# Patient Record
Sex: FEMALE | Race: WHITE | Employment: FULL TIME
[De-identification: names, ages, dates, MRNs, and addresses within clinical notes are randomized per-mention and may not be internally consistent; named-entity substitution may affect disease eponyms.]

---

## 2017-07-22 ENCOUNTER — HEALTH MAINTENANCE LETTER (OUTPATIENT)
Age: 37
End: 2017-07-22

## 2017-10-21 ENCOUNTER — HOSPITAL ENCOUNTER (EMERGENCY)
Facility: CLINIC | Age: 37
Discharge: HOME | End: 2017-10-21
Attending: EMERGENCY MEDICINE | Admitting: EMERGENCY MEDICINE
Payer: COMMERCIAL

## 2017-10-21 ENCOUNTER — APPOINTMENT (OUTPATIENT)
Dept: CT IMAGING | Facility: CLINIC | Age: 37
End: 2017-10-21
Attending: EMERGENCY MEDICINE
Payer: COMMERCIAL

## 2017-10-21 ENCOUNTER — APPOINTMENT (OUTPATIENT)
Dept: ULTRASOUND IMAGING | Facility: CLINIC | Age: 37
End: 2017-10-21
Attending: EMERGENCY MEDICINE
Payer: COMMERCIAL

## 2017-10-21 VITALS
HEART RATE: 73 BPM | RESPIRATION RATE: 16 BRPM | OXYGEN SATURATION: 100 % | HEIGHT: 69 IN | SYSTOLIC BLOOD PRESSURE: 137 MMHG | WEIGHT: 240 LBS | TEMPERATURE: 97 F | DIASTOLIC BLOOD PRESSURE: 86 MMHG | BODY MASS INDEX: 35.55 KG/M2

## 2017-10-21 DIAGNOSIS — I82.811 ACUTE SUPERFICIAL VENOUS THROMBOSIS OF LOWER EXTREMITY, RIGHT: ICD-10-CM

## 2017-10-21 LAB
ALBUMIN SERPL-MCNC: 3.3 G/DL (ref 3.4–5)
ALP SERPL-CCNC: 58 U/L (ref 40–150)
ALT SERPL W P-5'-P-CCNC: 22 U/L (ref 0–50)
ANION GAP SERPL CALCULATED.3IONS-SCNC: 4 MMOL/L (ref 3–14)
AST SERPL W P-5'-P-CCNC: 15 U/L (ref 0–45)
BASOPHILS # BLD AUTO: 0.1 10E9/L (ref 0–0.2)
BASOPHILS NFR BLD AUTO: 1 %
BILIRUB SERPL-MCNC: 0.7 MG/DL (ref 0.2–1.3)
BUN SERPL-MCNC: 14 MG/DL (ref 7–30)
CALCIUM SERPL-MCNC: 7.9 MG/DL (ref 8.5–10.1)
CHLORIDE SERPL-SCNC: 107 MMOL/L (ref 94–109)
CO2 SERPL-SCNC: 26 MMOL/L (ref 20–32)
CREAT SERPL-MCNC: 0.7 MG/DL (ref 0.52–1.04)
DIFFERENTIAL METHOD BLD: ABNORMAL
EOSINOPHIL # BLD AUTO: 0.1 10E9/L (ref 0–0.7)
EOSINOPHIL NFR BLD AUTO: 1.2 %
ERYTHROCYTE [DISTWIDTH] IN BLOOD BY AUTOMATED COUNT: 16.6 % (ref 10–15)
GFR SERPL CREATININE-BSD FRML MDRD: >90 ML/MIN/1.7M2
GLUCOSE SERPL-MCNC: 94 MG/DL (ref 70–99)
HCT VFR BLD AUTO: 35.4 % (ref 35–47)
HGB BLD-MCNC: 10.7 G/DL (ref 11.7–15.7)
IMM GRANULOCYTES # BLD: 0 10E9/L (ref 0–0.4)
IMM GRANULOCYTES NFR BLD: 0.2 %
LYMPHOCYTES # BLD AUTO: 1.6 10E9/L (ref 0.8–5.3)
LYMPHOCYTES NFR BLD AUTO: 30.7 %
MCH RBC QN AUTO: 23.4 PG (ref 26.5–33)
MCHC RBC AUTO-ENTMCNC: 30.2 G/DL (ref 31.5–36.5)
MCV RBC AUTO: 78 FL (ref 78–100)
MONOCYTES # BLD AUTO: 0.4 10E9/L (ref 0–1.3)
MONOCYTES NFR BLD AUTO: 8.2 %
NEUTROPHILS # BLD AUTO: 3 10E9/L (ref 1.6–8.3)
NEUTROPHILS NFR BLD AUTO: 58.7 %
NRBC # BLD AUTO: 0 10*3/UL
NRBC BLD AUTO-RTO: 0 /100
PLATELET # BLD AUTO: 276 10E9/L (ref 150–450)
POTASSIUM SERPL-SCNC: 4.5 MMOL/L (ref 3.4–5.3)
PROT SERPL-MCNC: 7 G/DL (ref 6.8–8.8)
RBC # BLD AUTO: 4.57 10E12/L (ref 3.8–5.2)
SODIUM SERPL-SCNC: 137 MMOL/L (ref 133–144)
TROPONIN I SERPL-MCNC: <0.015 UG/L (ref 0–0.04)
WBC # BLD AUTO: 5.1 10E9/L (ref 4–11)

## 2017-10-21 PROCEDURE — 25000128 H RX IP 250 OP 636: Performed by: EMERGENCY MEDICINE

## 2017-10-21 PROCEDURE — 85025 COMPLETE CBC W/AUTO DIFF WBC: CPT | Performed by: EMERGENCY MEDICINE

## 2017-10-21 PROCEDURE — 84484 ASSAY OF TROPONIN QUANT: CPT | Performed by: EMERGENCY MEDICINE

## 2017-10-21 PROCEDURE — 93971 EXTREMITY STUDY: CPT | Mod: RT

## 2017-10-21 PROCEDURE — 80053 COMPREHEN METABOLIC PANEL: CPT | Performed by: EMERGENCY MEDICINE

## 2017-10-21 PROCEDURE — 71260 CT THORAX DX C+: CPT

## 2017-10-21 PROCEDURE — 99285 EMERGENCY DEPT VISIT HI MDM: CPT | Mod: 25

## 2017-10-21 PROCEDURE — 96360 HYDRATION IV INFUSION INIT: CPT | Mod: 59

## 2017-10-21 RX ORDER — IOPAMIDOL 755 MG/ML
500 INJECTION, SOLUTION INTRAVASCULAR ONCE
Status: COMPLETED | OUTPATIENT
Start: 2017-10-21 | End: 2017-10-21

## 2017-10-21 RX ADMIN — SODIUM CHLORIDE 100 ML: 9 INJECTION, SOLUTION INTRAVENOUS at 10:13

## 2017-10-21 RX ADMIN — IOPAMIDOL 85 ML: 755 INJECTION, SOLUTION INTRAVENOUS at 10:12

## 2017-10-21 RX ADMIN — SODIUM CHLORIDE 500 ML: 9 INJECTION, SOLUTION INTRAVENOUS at 09:23

## 2017-10-21 ASSESSMENT — ENCOUNTER SYMPTOMS
JOINT SWELLING: 0
CHEST TIGHTNESS: 1
COUGH: 0
FEVER: 0
TROUBLE SWALLOWING: 0
BACK PAIN: 0
CHILLS: 0
RHINORRHEA: 0
DIAPHORESIS: 0
NECK PAIN: 0
SHORTNESS OF BREATH: 1
MYALGIAS: 1
SORE THROAT: 0
NUMBNESS: 0
COLOR CHANGE: 0
WEAKNESS: 0

## 2017-10-21 NOTE — ED AVS SNAPSHOT
[facility] Emergency Department    [address]    [facility] [address]    Phone:  [phone]    Fax:  [phone]                                       Moris Rhoades   MRN: [id number]    Department:  [facility] Emergency Department   Date of Visit:  10/21/2017           After Visit Summary Signature Page     I have received my discharge instructions, and my questions have been answered. I have discussed any challenges I see with this plan with the nurse or doctor.    ..........................................................................................................................................  Patient/Patient Representative Signature      ..........................................................................................................................................  Patient Representative Print Name and Relationship to Patient    ..................................................               ................................................  Date                                            Time    ..........................................................................................................................................  Reviewed by Signature/Title    ...................................................              ..............................................  Date                                                            Time

## 2017-10-21 NOTE — ED NOTES
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 Pt first noted pain on Tuesday seen in [facility] Thurs dx with superficial blood clot at that time, No lab tests done and pt is not on blood thinners. Pain has increased pt notes it has been more difficult to catch her breath. VSS, A/ox 4, ABC's intact. Pain worse with ambulation.

## 2017-10-21 NOTE — ED AVS SNAPSHOT
[facility] Emergency Department    [address] [place] [address]    Phone:  [phone]    Fax:  [phone]                                       Moris Rhoades   MRN: [id number]    Department:  [facility] Emergency Department   Date of Visit:  10/21/2017           Patient Information     Date Of Birth          1980        Your diagnoses for this visit were:     Acute superficial venous thrombosis of lower extremity, right        You were seen by Ro Weiss MD.      Follow-up Information     Follow up with Chantel Chamberlain MD In 3 days.    Contact information:    [facility]  [address] [place] [address]  [phone]          Discharge Instructions       Please follow up closely with your regular physician within the next 3 days. Please return to the ED if your symptoms worsen or if you develop new or concerning symptoms.       Superficial Thrombophlebitis   The superficial veins are the veins near the surface of the skin. Superficial thrombophlebitis is a problem that occurs when one or more of these veins become inflamed (red, irritated, and swollen). This is most often because of a blood clot.  Causes  The problem may occur after injury to a vein. It may also occur after having an intravenous (IV) line placed. Other factors that can make the problem more likely include:    Varicose veins    Venous insufficiency    Bleeding disorders    Prolonged periods of rest and not moving around    IV drug abuse  Symptoms  Symptoms may appear in the affected area. They can include:    Pain    Tenderness    Redness    Warmth    Swelling    Hardening of the vein  In most cases, superficial thrombophlebitis resolves on its own with no problems. Treatment is focused on relieving symptoms.  Sometimes, there is a risk that the deep veins in the body may also be involved. This can lead to more serious problems. In such cases, further testing and  treatments may be needed. Your healthcare provider can tell you more about this.  Home Care  To help relieve pain and swelling, you may be told to:    Apply heat or cold to the affected area. Do this for up to 10 minutes as often as directed.    Heat: Use a warm compress, such as a heating pad.    Cold: Use a cold compress, such as a cold pack or bag of ice wrapped in a thin towel.    Take nonsteroidal anti-inflammatory drugs (NSAIDS), such as ibuprofen. In some cases, other pain medicines may be prescribed.    Keep the affected limb (arm or leg) raised above heart level as directed.    Wear elastic compression stockings or bandages as directed.    Avoid prolonged sitting or standing. Get up and walk often.  To help treat a blood clot, a blood thinner (anticoagulant) may be prescribed. If this is needed, be sure to take the medicine exactly as directed.  Follow-up care  Follow up with your healthcare provider as advised. If imaging tests are done, they will be reviewed by a doctor. You ll be told the results and any new findings that may affect your treatment.  When to seek medical advice  Call your healthcare provider right away if any of these occur:    Fever of 100.4 F (38 C) or higher, or as directed by your provider    Increasing pain, swelling, or tenderness in the affected area    Spreading warmth or redness in the affected area  Call 911  Call 911 right away if any of these occur:    Trouble breathing    Chest pain or discomfort that worsens with deep breathing or coughing    Coughing (may cough up blood)    Fast or irregular heartbeat    Sweating    Anxiety    Lightheadedness, dizziness, or fainting    Extreme confusion    Extreme drowsiness or trouble waking up    New pain in the chest, arm, shoulder, neck, or upper back  Date Last Reviewed: 9/21/2015 2000-2017 [employer]. [address] [place] [address], [place] [address]. All rights reserved. This information is not intended as a substitute for  professional medical care. Always follow your healthcare professional's instructions.          24 Hour Appointment Hotline       To make an appointment at any [facility], call [phone] ([phone]). If you don't have a family doctor or clinic, we will help you find one. [place] clinics are conveniently located to serve the needs of you and your family.             Review of your medicines      START taking        Dose / Directions Last dose taken    enoxaparin 150 MG/ML injection   Commonly known as:  LOVENOX   Dose:  1 mg/kg   Quantity:  9.8 mL        Inject 0.7 mLs (105 mg) Subcutaneous every 12 hours for 7 days   Refills:  0          Our records show that you are taking the medicines listed below. If these are incorrect, please call your family doctor or clinic.        Dose / Directions Last dose taken    NO ACTIVE MEDICATIONS        Refills:  0        traMADol 50 MG tablet   Commonly known as:  ULTRAM   Dose:  50 mg   Quantity:  15 tablet        Take 1 tablet (50 mg) by mouth every 6 hours as needed for pain   Refills:  0        TYLENOL PO        Refills:  0                Prescriptions were sent or printed at these locations (1 Prescription)                   Other Prescriptions                Printed at Department/Unit printer (1 of 1)         enoxaparin (LOVENOX) 150 MG/ML injection                Procedures and tests performed during your visit     CBC + differential    Chest CT, IV contrast only - PE protocol    Comprehensive metabolic panel    EKG 12 lead    Troponin I    US Lower Extremity Venous Duplex Right      Orders Needing Specimen Collection     None      Pending Results     No orders found from 10/19/2017 to 10/22/2017.            Pending Culture Results     No orders found from 10/19/2017 to 10/22/2017.            Pending Results Instructions     If you had any lab results that were not finalized at the time of your Discharge, you can call the ED Lab Result RN at [phone]. You  will be contacted by this team for any positive Lab results or changes in treatment. The nurses are available 7 days a week from 10A to 6:30P.  You can leave a message 24 hours per day and they will return your call.        Test Results From Your Hospital Stay        10/21/2017  9:27 AM      Component Results     Component Value Ref Range & Units Status    WBC 5.1 4.0 - 11.0 10e9/L Final    RBC Count 4.57 3.8 - 5.2 10e12/L Final    Hemoglobin 10.7 (L) 11.7 - 15.7 g/dL Final    Hematocrit 35.4 35.0 - 47.0 % Final    MCV 78 78 - 100 fl Final    MCH 23.4 (L) 26.5 - 33.0 pg Final    MCHC 30.2 (L) 31.5 - 36.5 g/dL Final    RDW 16.6 (H) 10.0 - 15.0 % Final    Platelet Count 276 150 - 450 10e9/L Final    Diff Method Automated Method  Final    % Neutrophils 58.7 % Final    % Lymphocytes 30.7 % Final    % Monocytes 8.2 % Final    % Eosinophils 1.2 % Final    % Basophils 1.0 % Final    % Immature Granulocytes 0.2 % Final    Nucleated RBCs 0 0 /100 Final    Absolute Neutrophil 3.0 1.6 - 8.3 10e9/L Final    Absolute Lymphocytes 1.6 0.8 - 5.3 10e9/L Final    Absolute Monocytes 0.4 0.0 - 1.3 10e9/L Final    Absolute Eosinophils 0.1 0.0 - 0.7 10e9/L Final    Absolute Basophils 0.1 0.0 - 0.2 10e9/L Final    Abs Immature Granulocytes 0.0 0 - 0.4 10e9/L Final    Absolute Nucleated RBC 0.0  Final         10/21/2017  9:46 AM      Component Results     Component Value Ref Range & Units Status    Troponin I ES <0.015 0.000 - 0.045 ug/L Final    The 99th percentile for upper reference range is 0.045 ug/L.  Troponin values   in the range of 0.045 - 0.120 ug/L may be associated with risks of adverse   clinical events.           10/21/2017 10:31 AM      Narrative       RIGHT LEG VENOUS ULTRASOUND 10/21/2017 9:54 AM    HISTORY: pain, Hx of factor 5 leiden, recent diagnosis of superficial  clot     TECHNIQUE; Venous Doppler ultrasound with color flow and spectral  Doppler with waveform analysis performed. Compression and  augmentation  performed.    COMPARISON no prior available ultrasound of the right leg.     FINDINGS: There is no evidence for deep vein thrombus in the right  common femoral, superficial femoral, popliteal, or visualized portions  of posterior tibial veins. Exam is positive for superficial thrombus  in the lesser saphenous vein at the level of the knee through the mid  calf.        Impression     IMPRESSION:   1. Positive for lesser saphenous vein thrombus at the level of the  knee. No evidence for deep vein thrombus right leg.    DUANE FLOOD MD         10/21/2017 10:35 AM      Narrative     CT CHEST PULMONARY EMBOLISM WITH OOIFVXNX14/21/2017 10:27 AM    HISTORY: Shortness of breath, Hx of factor 5 leiden    TECHNIQUE: Axial images from thoracic inlet to diaphragm. 85mL  Isovue-370 IV contrast.  Radiation dose for this scan was reduced  using automated exposure control, adjustment of the mA and/or kV  according to patient size, or iterative reconstruction technique.    COMPARISON: 8/1/2007 CT chest    FINDINGS:   CHEST: No evidence for acute pulmonary embolus. No aortic dissection,  mediastinal or hilar adenopathy. Postop changes of gastric bypass. The  lungs are clear. No aggressive bone lesions.        Impression     IMPRESSION:   1. No acute pulmonary embolus, pleural effusion or acute pulmonary  abnormality.    DUANE FLOOD MD         10/21/2017  9:46 AM      Component Results     Component Value Ref Range & Units Status    Sodium 137 133 - 144 mmol/L Final    Potassium 4.5 3.4 - 5.3 mmol/L Final    Chloride 107 94 - 109 mmol/L Final    Carbon Dioxide 26 20 - 32 mmol/L Final    Anion Gap 4 3 - 14 mmol/L Final    Glucose 94 70 - 99 mg/dL Final    Urea Nitrogen 14 7 - 30 mg/dL Final    Creatinine 0.70 0.52 - 1.04 mg/dL Final    GFR Estimate >90 >60 mL/min/1.7m2 Final    Non  GFR Calc    GFR Estimate If Black >90 >60 mL/min/1.7m2 Final    African American GFR Calc    Calcium 7.9 (L) 8.5 -  10.1 mg/dL Final    Bilirubin Total 0.7 0.2 - 1.3 mg/dL Final    Albumin 3.3 (L) 3.4 - 5.0 g/dL Final    Protein Total 7.0 6.8 - 8.8 g/dL Final    Alkaline Phosphatase 58 40 - 150 U/L Final    ALT 22 0 - 50 U/L Final    AST 15 0 - 45 U/L Final                Clinical Quality Measure: Blood Pressure Screening     Your blood pressure was checked while you were in the emergency department today. The last reading we obtained was  BP: 113/72 . Please read the guidelines below about what these numbers mean and what you should do about them.  If your systolic blood pressure (the top number) is less than 120 and your diastolic blood pressure (the bottom number) is less than 80, then your blood pressure is normal. There is nothing more that you need to do about it.  If your systolic blood pressure (the top number) is 120-139 or your diastolic blood pressure (the bottom number) is 80-89, your blood pressure may be higher than it should be. You should have your blood pressure rechecked within a year by a primary care provider.  If your systolic blood pressure (the top number) is 140 or greater or your diastolic blood pressure (the bottom number) is 90 or greater, you may have high blood pressure. High blood pressure is treatable, but if left untreated over time it can put you at risk for heart attack, stroke, or kidney failure. You should have your blood pressure rechecked by a primary care provider within the next 4 weeks.  If your provider in the emergency department today gave you specific instructions to follow-up with your doctor or provider even sooner than that, you should follow that instruction and not wait for up to 4 weeks for your follow-up visit.        Thank you for choosing [facility]       Thank you for choosing [facility] for your care. Our goal is always to provide you with excellent care. Hearing back from our patients is one way we can continue to improve our services. Please take a few minutes to complete the  written survey that you may receive in the mail after you visit with us. Thank you!        [employer] Information     [facility] gives you secure access to your electronic health record. If you see a primary care provider, you can also send messages to your care team and make appointments. If you have questions, please call your primary care clinic.  If you do not have a primary care provider, please call [phone] and they will assist you.        Care EveryWhere ID     This is your Care EveryWhere ID. This could be used by other organizations to access your [facility] medical records  [id number]        Equal Access to Services     [facility] : Tanner Nieto, wang salazar, panfilo matt, zeeshan varner . So [facility] [phone].    ATENCIÓN: Si habla español, tiene a barlow disposición servicios gratuitos de asistencia lingüística. Ammon al [phone].    We comply with applicable federal civil rights laws and [place] laws. We do not discriminate on the basis of race, color, national origin, age, disability, sex, sexual orientation, or gender identity.            After Visit Summary       This is your record. Keep this with you and show to your community pharmacist(s) and doctor(s) at your next visit.

## 2017-10-21 NOTE — ED NOTES
Patient has returned from radiology. No change in pain or shortness of breath.  Patient is watching TV, Denies needs.

## 2017-10-21 NOTE — ED PROVIDER NOTES
History     Chief Complaint:  Leg pain    HPI   Moris Rhoades is a 37 year old female with a history of DVT and factor 5 Leiden who presents to the ED for evaluation of leg pain. The patient reports that she started experiencing pain in her right inner calf 4 days ago. 2 days ago, she was seen in urgent care where they did an ultrasound. This revealed a superficial thrombus and she was told to use heat and try to stay off of the leg as much as possible. Today, the pain has gotten worse and has traveled up to the medial aspect of the knee and the popliteal region. She also notes having chest discomfort today and is feeling short of breath. She denies any redness or swelling of the leg but notes she wore compression stockings on and off yesterday. She notes that she has been sitting more but denies any recent long travel. She denies any rash, fever, cough, recent cold, numbness, weakness, neck pain, back pain, or any other symptoms. She does not have a hematologist but notes seeing Dr. Odonnell at a thrombosis clinic about 1 1/2 years ago.       CARDIAC RISK FACTORS:  Sex:    Female  Tobacco:   No  Hypertension:   No  Hyperlipidemia:  No  Diabetes:   No  Family History:  No    PE/DVT RISK FACTORS:  Sex:    Female  Hormones:   Yes  Tobacco:   No  Cancer:   No  Travel:   No  Surgery:   No  Other immobilization: No  Personal history:  Yes  Family history:  No    Allergies:  Nsaids  Sulfa drugs (rash)     Medications:    Mirena  Tylenol  Ultram     Past Medical History:    Abdominal pain  Acute venous embolism and thrombosis of deep vessels of proximal lower extremity  Adjustment reaction  Benign neoplasm of ovary  Eating disorder  Embolism and thrombosis  Excessive or frequent menstruation  Factor 5 Leiden mutation, heterozygous  Headache  Irregular menstrual cycle  Joint pain, left leg  Obesity  Pneumonia, organism    Past Surgical History:    Repair anterior tibial tubercle, left  Bariatric surgery, gastric  "bypass  Endometrial biopsy without cervical dilation  Left knee arthroscopy with plica resection  Knee scope, med/lat menisectomy, left   Removal of left ovary due to large cyst, torison    Family History:    Diabetes  Allergies  Hypertension  Asthma    Social History:  Smoking status: Never  Alcohol use: Yes  Marital Status: Single      Review of Systems   Constitutional: Negative for chills, diaphoresis and fever.   HENT: Negative for congestion, rhinorrhea, sore throat and trouble swallowing.    Respiratory: Positive for chest tightness (pressure/discomfort) and shortness of breath. Negative for cough.    Cardiovascular: Negative for chest pain and leg swelling.   Musculoskeletal: Positive for myalgias (right leg). Negative for back pain, gait problem, joint swelling and neck pain.   Skin: Negative for color change and rash.   Neurological: Negative for weakness and numbness.   All other systems reviewed and are negative.    Physical Exam   Patient Vitals for the past 24 hrs:   BP Temp Temp src Pulse Resp SpO2 Height Weight   10/21/17 1000 113/72 - - 53 16 100 % - -   10/21/17 0850 - 97  F (36.1  C) Temporal 82 18 100 % - -   10/21/17 0833 124/81 97  F (36.1  C) Temporal 71 16 97 % 1.753 m (5' 9\") 108.9 kg (240 lb)     Physical Exam  Constitutional: The patient is oriented to person, place, and time. Alert and cooperative.  HENT:   Right Ear: External ear normal.   Left Ear: External ear normal.   Nose: Nose normal.   Mouth/Throat: Uvula is midline, oropharynx is clear and moist and mucous membranes are normal. No posterior oropharyngeal edema or erythema.   Eyes: Conjunctivae, EOM and lids are normal. Pupils are equal, round, and reactive to light.   Neck: Trachea normal. Normal range of motion. Neck supple.   Cardiovascular: Normal rate, regular rhythm, normal heart sounds, and intact distal pulses.    Pulmonary/Chest: Effort normal and breath sounds equal bilaterally. No crackles or wheezing.   Abdominal: " Soft. No tenderness. No rebound and no guarding.   Musculoskeletal: Normal range of motion.  No extremity erythema or edema. Tenderness to palpation over the R calf region.  Neurological: Alert and Oriented. Strength 5/5 in upper and lower extremities bilaterally. Sensation intact to light touch throughout.  Skin: Skin is dry. No rash noted.          Emergency Department Course   ECG (09:01:35):  Rate 54 bpm. SD interval 156. QRS duration 84. QT/QTc 416/394. P-R-T axes 49 2 28. Sinus bradycardia. Otherwise normal ECG. Interpreted at 0904 by Ro Weiss MD.    Imaging:  Radiographic findings were communicated with the patient who voiced understanding of the findings.    CT-scan Chest w/ IV contrast only - PE protocol:  1. No acute pulmonary embolus, pleural effusion or acute pulmonary  abnormality.  Result per radiology.     US Lower Extremity Venous Duplex Right:  IMPRESSION:   1. Positive for lesser saphenous vein thrombus at the level of the  knee. No evidence for deep vein thrombus right leg.  Results per radiology.     Laboratory:  CMP: Calcium 7.9 (L), Albumin 3.3 (L) o/w WNL (Creatinine 0.70)  CBC: HGB 10.7 (L) o/w WNL (WBC 5.1, )   (0915) Troponin: <0.015    Interventions:  0923:  mL IV Bolus  1013:  mL IV Bolus    Emergency Department Course:  Past medical records, nursing notes, and vitals reviewed.  0849: I performed an exam of the patient and obtained history, as documented above. GCS 15.    The patient was sent for a CT and US while in the emergency department, findings above.    IV inserted and blood drawn.    1137: I discussed the case with Dr. Simons with oncology regarding the patient.    1156: I rechecked the patient. Findings and plan explained to the Patient. Patient discharged home with instructions regarding supportive care, medications, and reasons to return. The importance of close follow-up was reviewed.     Impression & Plan      Medical Decision Making:  Moris ALFARO  Jf is a 37 year old female with a history of DVT and factor 5 Leiden, not on anticoagulation, who presents to the ED for evaluation of right lower extremity pain. Upon presentation in the ED, the patient is non-toxic appearing. Vitals are within normal limits and stable. On exam, the patient is well appearing. The patient is alert, oriented, and neuro exam is non-focal. Cardiopulmonary exam is unremarkable. The abdomen is soft and non-tender throughout. On exam of the right lower extremity, I do not appreciate any obvious deformity. There is no significant edema or erythema. She does endorse tenderness to palpation in the calf region. She is neurovascularly intact. The rest of the exam is as mentioned above. Ultrasound imaging of the right lower extremity was obtained, demonstrating no evidence of DVT, but does demonstrate a lesser saphenous vein thrombus at the level of the knee. This is consistent with a superficial thrombus. Given that the patient does endorse chest pain and shortness of breath, a CT PE study was obtained and demonstrates no evidence for PE. There is no other acute pulmonary process noted. Labs were obtained and are as mentioned above. An EKG was obtained and demonstrates sinus rhythm. There are no concerning acute ischemic changes. These results were discussed with the patient who states understanding. Overall, given this patient s history and presentation, I do feel that the patient s symptoms are likely secondary to a superficial vein thrombosis. The patient notes that she has been trying conservative management at home without improvement. Overall, given that the patient does have a history of DVT and factor 5 Leiden, I did discuss the patient with the on call hematologist. He did recommend initiation of Lovenox at 1 mg per kg BID for one week. He then recommended that the patient follow up closely with her primary care provider. I discussed this with the patient and he notes  understanding and agreement with this plan. The patient notes that she has been on lovenox in the past and she feels very comfortable taking it. Overall, given that she is well appearing and with a relatively unremarkable work up, I do feel that the patient can be discharged to home. The patient was instructed to follow up closely with their PCP. The patient states understanding and agreement with the plan. Return instructions were given. The patient was stable/improved at time of discharge.     Diagnosis:    ICD-10-CM   1. Acute superficial venous thrombosis of lower extremity, right I82.811       Disposition:  discharged to home    Discharge Medications:  New Prescriptions    ENOXAPARIN (LOVENOX) 150 MG/ML INJECTION    Inject 0.7 mLs (105 mg) Subcutaneous every 12 hours for 7 days         Macie Cano  10/21/2017   [facility] EMERGENCY DEPARTMENT  IMacie, am serving as a scribe at 8:49 AM on 10/21/2017 to document services personally performed by Ro Weiss MD based on my observations and the provider's statements to me.        Ro Weiss MD  10/21/17 1919

## 2017-10-21 NOTE — DISCHARGE INSTRUCTIONS
Please follow up closely with your regular physician within the next 3 days. Please return to the ED if your symptoms worsen or if you develop new or concerning symptoms.       Superficial Thrombophlebitis   The superficial veins are the veins near the surface of the skin. Superficial thrombophlebitis is a problem that occurs when one or more of these veins become inflamed (red, irritated, and swollen). This is most often because of a blood clot.  Causes  The problem may occur after injury to a vein. It may also occur after having an intravenous (IV) line placed. Other factors that can make the problem more likely include:    Varicose veins    Venous insufficiency    Bleeding disorders    Prolonged periods of rest and not moving around    IV drug abuse  Symptoms  Symptoms may appear in the affected area. They can include:    Pain    Tenderness    Redness    Warmth    Swelling    Hardening of the vein  In most cases, superficial thrombophlebitis resolves on its own with no problems. Treatment is focused on relieving symptoms.  Sometimes, there is a risk that the deep veins in the body may also be involved. This can lead to more serious problems. In such cases, further testing and treatments may be needed. Your healthcare provider can tell you more about this.  Home Care  To help relieve pain and swelling, you may be told to:    Apply heat or cold to the affected area. Do this for up to 10 minutes as often as directed.    Heat: Use a warm compress, such as a heating pad.    Cold: Use a cold compress, such as a cold pack or bag of ice wrapped in a thin towel.    Take nonsteroidal anti-inflammatory drugs (NSAIDS), such as ibuprofen. In some cases, other pain medicines may be prescribed.    Keep the affected limb (arm or leg) raised above heart level as directed.    Wear elastic compression stockings or bandages as directed.    Avoid prolonged sitting or standing. Get up and walk often.  To help treat a blood clot, a  blood thinner (anticoagulant) may be prescribed. If this is needed, be sure to take the medicine exactly as directed.  Follow-up care  Follow up with your healthcare provider as advised. If imaging tests are done, they will be reviewed by a doctor. You ll be told the results and any new findings that may affect your treatment.  When to seek medical advice  Call your healthcare provider right away if any of these occur:    Fever of 100.4 F (38 C) or higher, or as directed by your provider    Increasing pain, swelling, or tenderness in the affected area    Spreading warmth or redness in the affected area  Call 911  Call 911 right away if any of these occur:    Trouble breathing    Chest pain or discomfort that worsens with deep breathing or coughing    Coughing (may cough up blood)    Fast or irregular heartbeat    Sweating    Anxiety    Lightheadedness, dizziness, or fainting    Extreme confusion    Extreme drowsiness or trouble waking up    New pain in the chest, arm, shoulder, neck, or upper back  Date Last Reviewed: 9/21/2015 2000-2017 [employer]. [address] [place] [address]. All rights reserved. This information is not intended as a substitute for professional medical care. Always follow your healthcare professional's instructions.

## 2017-10-23 LAB — INTERPRETATION ECG - MUSE: NORMAL

## 2018-09-05 ENCOUNTER — APPOINTMENT (OUTPATIENT)
Dept: CT IMAGING | Facility: CLINIC | Age: 38
End: 2018-09-05
Attending: EMERGENCY MEDICINE
Payer: COMMERCIAL

## 2018-09-05 ENCOUNTER — HOSPITAL ENCOUNTER (EMERGENCY)
Facility: CLINIC | Age: 38
Discharge: HOME | End: 2018-09-05
Attending: EMERGENCY MEDICINE | Admitting: EMERGENCY MEDICINE
Payer: COMMERCIAL

## 2018-09-05 VITALS
TEMPERATURE: 97.8 F | OXYGEN SATURATION: 99 % | RESPIRATION RATE: 16 BRPM | DIASTOLIC BLOOD PRESSURE: 70 MMHG | SYSTOLIC BLOOD PRESSURE: 113 MMHG

## 2018-09-05 DIAGNOSIS — R10.13 ABDOMINAL PAIN, EPIGASTRIC: ICD-10-CM

## 2018-09-05 LAB
ALBUMIN SERPL-MCNC: 3 G/DL (ref 3.4–5)
ALBUMIN UR-MCNC: NEGATIVE MG/DL
ALP SERPL-CCNC: 53 U/L (ref 40–150)
ALT SERPL W P-5'-P-CCNC: 43 U/L (ref 0–50)
ANION GAP SERPL CALCULATED.3IONS-SCNC: 8 MMOL/L (ref 3–14)
APPEARANCE UR: CLEAR
AST SERPL W P-5'-P-CCNC: 48 U/L (ref 0–45)
BASOPHILS # BLD AUTO: 0 10E9/L (ref 0–0.2)
BASOPHILS NFR BLD AUTO: 0.3 %
BILIRUB SERPL-MCNC: 1 MG/DL (ref 0.2–1.3)
BILIRUB UR QL STRIP: NEGATIVE
BUN SERPL-MCNC: 7 MG/DL (ref 7–30)
CALCIUM SERPL-MCNC: 7.6 MG/DL (ref 8.5–10.1)
CHLORIDE SERPL-SCNC: 109 MMOL/L (ref 94–109)
CO2 SERPL-SCNC: 22 MMOL/L (ref 20–32)
COLOR UR AUTO: ABNORMAL
CREAT SERPL-MCNC: 0.68 MG/DL (ref 0.52–1.04)
DIFFERENTIAL METHOD BLD: ABNORMAL
EOSINOPHIL # BLD AUTO: 0 10E9/L (ref 0–0.7)
EOSINOPHIL NFR BLD AUTO: 0 %
ERYTHROCYTE [DISTWIDTH] IN BLOOD BY AUTOMATED COUNT: 23.2 % (ref 10–15)
GFR SERPL CREATININE-BSD FRML MDRD: >90 ML/MIN/1.7M2
GLUCOSE SERPL-MCNC: 107 MG/DL (ref 70–99)
GLUCOSE UR STRIP-MCNC: NEGATIVE MG/DL
HCG SERPL QL: NEGATIVE
HCT VFR BLD AUTO: 42.6 % (ref 35–47)
HGB BLD-MCNC: 12.9 G/DL (ref 11.7–15.7)
HGB UR QL STRIP: ABNORMAL
IMM GRANULOCYTES # BLD: 0 10E9/L (ref 0–0.4)
IMM GRANULOCYTES NFR BLD: 0.3 %
INR PPP: 3.22 (ref 0.86–1.14)
KETONES UR STRIP-MCNC: NEGATIVE MG/DL
LACTATE BLD-SCNC: 0.9 MMOL/L (ref 0.7–2)
LEUKOCYTE ESTERASE UR QL STRIP: NEGATIVE
LIPASE SERPL-CCNC: 98 U/L (ref 73–393)
LYMPHOCYTES # BLD AUTO: 0.6 10E9/L (ref 0.8–5.3)
LYMPHOCYTES NFR BLD AUTO: 7.5 %
MCH RBC QN AUTO: 23.5 PG (ref 26.5–33)
MCHC RBC AUTO-ENTMCNC: 30.3 G/DL (ref 31.5–36.5)
MCV RBC AUTO: 78 FL (ref 78–100)
MONOCYTES # BLD AUTO: 0.3 10E9/L (ref 0–1.3)
MONOCYTES NFR BLD AUTO: 4.1 %
NEUTROPHILS # BLD AUTO: 6.5 10E9/L (ref 1.6–8.3)
NEUTROPHILS NFR BLD AUTO: 87.8 %
NITRATE UR QL: NEGATIVE
NRBC # BLD AUTO: 0 10*3/UL
NRBC BLD AUTO-RTO: 0 /100
PH UR STRIP: 6 PH (ref 5–7)
PLATELET # BLD AUTO: 194 10E9/L (ref 150–450)
POTASSIUM SERPL-SCNC: 4 MMOL/L (ref 3.4–5.3)
PROT SERPL-MCNC: 5.9 G/DL (ref 6.8–8.8)
RBC # BLD AUTO: 5.48 10E12/L (ref 3.8–5.2)
RBC #/AREA URNS AUTO: 2 /HPF (ref 0–2)
SODIUM SERPL-SCNC: 139 MMOL/L (ref 133–144)
SOURCE: ABNORMAL
SP GR UR STRIP: 1.02 (ref 1–1.03)
SQUAMOUS #/AREA URNS AUTO: 1 /HPF (ref 0–1)
UROBILINOGEN UR STRIP-MCNC: 0 MG/DL (ref 0–2)
WBC # BLD AUTO: 7.4 10E9/L (ref 4–11)
WBC #/AREA URNS AUTO: 4 /HPF (ref 0–5)

## 2018-09-05 PROCEDURE — 80053 COMPREHEN METABOLIC PANEL: CPT | Performed by: EMERGENCY MEDICINE

## 2018-09-05 PROCEDURE — 99285 EMERGENCY DEPT VISIT HI MDM: CPT | Mod: 25

## 2018-09-05 PROCEDURE — 25000128 H RX IP 250 OP 636: Performed by: EMERGENCY MEDICINE

## 2018-09-05 PROCEDURE — 74177 CT ABD & PELVIS W/CONTRAST: CPT

## 2018-09-05 PROCEDURE — 25000125 ZZHC RX 250: Performed by: EMERGENCY MEDICINE

## 2018-09-05 PROCEDURE — 96374 THER/PROPH/DIAG INJ IV PUSH: CPT | Mod: 59

## 2018-09-05 PROCEDURE — 85025 COMPLETE CBC W/AUTO DIFF WBC: CPT | Performed by: EMERGENCY MEDICINE

## 2018-09-05 PROCEDURE — 25000128 H RX IP 250 OP 636

## 2018-09-05 PROCEDURE — 81001 URINALYSIS AUTO W/SCOPE: CPT | Performed by: EMERGENCY MEDICINE

## 2018-09-05 PROCEDURE — 85610 PROTHROMBIN TIME: CPT | Performed by: EMERGENCY MEDICINE

## 2018-09-05 PROCEDURE — 84703 CHORIONIC GONADOTROPIN ASSAY: CPT | Performed by: EMERGENCY MEDICINE

## 2018-09-05 PROCEDURE — 83690 ASSAY OF LIPASE: CPT | Performed by: EMERGENCY MEDICINE

## 2018-09-05 PROCEDURE — 83605 ASSAY OF LACTIC ACID: CPT | Performed by: EMERGENCY MEDICINE

## 2018-09-05 PROCEDURE — 96361 HYDRATE IV INFUSION ADD-ON: CPT

## 2018-09-05 PROCEDURE — 96375 TX/PRO/DX INJ NEW DRUG ADDON: CPT

## 2018-09-05 RX ORDER — ONDANSETRON 2 MG/ML
INJECTION INTRAMUSCULAR; INTRAVENOUS
Status: COMPLETED
Start: 2018-09-05 | End: 2018-09-05

## 2018-09-05 RX ORDER — ONDANSETRON 2 MG/ML
4 INJECTION INTRAMUSCULAR; INTRAVENOUS EVERY 30 MIN PRN
Status: DISCONTINUED | OUTPATIENT
Start: 2018-09-05 | End: 2018-09-05 | Stop reason: HOSPADM

## 2018-09-05 RX ORDER — SODIUM CHLORIDE 9 MG/ML
1000 INJECTION, SOLUTION INTRAVENOUS CONTINUOUS
Status: DISCONTINUED | OUTPATIENT
Start: 2018-09-05 | End: 2018-09-05 | Stop reason: HOSPADM

## 2018-09-05 RX ORDER — HYDROCODONE BITARTRATE AND ACETAMINOPHEN 5; 325 MG/1; MG/1
1 TABLET ORAL EVERY 6 HOURS PRN
Qty: 10 TABLET | Refills: 0 | Status: SHIPPED | OUTPATIENT
Start: 2018-09-05

## 2018-09-05 RX ORDER — IOPAMIDOL 755 MG/ML
500 INJECTION, SOLUTION INTRAVASCULAR ONCE
Status: COMPLETED | OUTPATIENT
Start: 2018-09-05 | End: 2018-09-05

## 2018-09-05 RX ORDER — ONDANSETRON 4 MG/1
4 TABLET, ORALLY DISINTEGRATING ORAL EVERY 8 HOURS PRN
Qty: 10 TABLET | Refills: 0 | Status: SHIPPED | OUTPATIENT
Start: 2018-09-05 | End: 2018-09-08

## 2018-09-05 RX ADMIN — ONDANSETRON 4 MG: 2 INJECTION INTRAMUSCULAR; INTRAVENOUS at 02:18

## 2018-09-05 RX ADMIN — IOPAMIDOL 100 ML: 755 INJECTION, SOLUTION INTRAVENOUS at 03:03

## 2018-09-05 RX ADMIN — SODIUM CHLORIDE 65 ML: 900 INJECTION, SOLUTION INTRAVENOUS at 03:03

## 2018-09-05 RX ADMIN — SODIUM CHLORIDE 1000 ML: 9 INJECTION, SOLUTION INTRAVENOUS at 02:18

## 2018-09-05 RX ADMIN — PANTOPRAZOLE SODIUM 40 MG: 40 INJECTION, POWDER, FOR SOLUTION INTRAVENOUS at 02:26

## 2018-09-05 ASSESSMENT — ENCOUNTER SYMPTOMS
BLOOD IN STOOL: 0
HEADACHES: 1
DYSURIA: 0
VOMITING: 1
BACK PAIN: 1
APPETITE CHANGE: 1
DIARRHEA: 1
ABDOMINAL PAIN: 1
NAUSEA: 1

## 2018-09-05 NOTE — ED AVS SNAPSHOT
[facility] Emergency Department    [address]    [facility] [address]    Phone:  [phone]    Fax:  [phone]                                       Moris Rhoades   MRN: [id number]    Department:  [facility] Emergency Department   Date of Visit:  9/5/2018           After Visit Summary Signature Page     I have received my discharge instructions, and my questions have been answered. I have discussed any challenges I see with this plan with the nurse or doctor.    ..........................................................................................................................................  Patient/Patient Representative Signature      ..........................................................................................................................................  Patient Representative Print Name and Relationship to Patient    ..................................................               ................................................  Date                                            Time    ..........................................................................................................................................  Reviewed by Signature/Title    ...................................................              ..............................................  Date                                                            Time          22EPIC Rev 08/18

## 2018-09-05 NOTE — DISCHARGE INSTRUCTIONS
*Abdominal Pain, Unknown Cause (Female)    The exact cause of your abdominal (stomach) pain is not certain. This does not mean that this is something to worry about, or the right tests were not done. Everyone likes to know the exact cause of the problem, but sometimes with abdominal pain, there is no clear-cut cause, and this could be a good thing. The good news is that your symptoms can be treated, and you will feel better.   Your condition does not seem serious now; however, sometimes the signs of a serious problem may take more time to appear. For this reason, it is important for you to watch for any new symptoms, problems, or worsening of your condition.  Over the next few days, the abdominal pain may come and go, or be continuous. Other common symptoms can include nausea and vomiting. Sometimes it can be difficult to tell if you feel nauseous, you may just feel bad and not associate that feeling with nausea. Constipation, diarrhea, and a fever may go along with the pain.  The pain may continue even if treated correctly over the following days. Depending on how things go, sometimes the cause can become clear and may require further or different treatment. Additional evaluations, medications, or tests may be needed.  Home care  Your health care provider may prescribe medications for pain, symptoms, or an infection.  Follow the health care provider's instructions for taking these medications.  General care    Rest until your next exam. No strenuous activities.    Try to find positions that ease discomfort. A small pillow placed on the abdomen may help relieve pain.    Something warm on your abdomen (such as a heating pad) may help, but be careful not to burn yourself.  Diet    Do not force yourself to eat, especially if having cramps, vomiting, or diarrhea.    Water is important so you do not get dehydrated. Soup may also be good. Sports drinks may also help, especially if they are not too acidic. Make sure you  don't drink sugary drinks as this can make things worse. Take liquids in small amounts. Do not guzzle them.    Caffeine sometimes makes the pain and cramping worse.    Avoid dairy products if you have vomiting or diarrhea.    Don't eat large amounts at a time. Wait a few minutes between bites.    Eat a diet low in fiber (called a low-residue diet). Foods allowed include refined breads, white rice, fruit and vegetable juices without pulp, tender meats. These foods will pass more easily through the intestine.    Avoid fried or fatty foods, dairy, alcohol and spicy foods until your symptoms go away.  Follow-up care  Follow up with your health care provider as instructed, or if your pain does not begin to improve in the next 24 hours.  When to seek medical care  Seek prompt medical care if any of the following occur:    Pain gets worse or moves to the right lower abdomen    New or worsening vomiting or diarrhea    Swelling of the abdomen    Unable to pass stool for more than three days    New fever over 101  F (38.3 C), or rising fever    Blood in vomit or bowel movements (dark red or black color)    Jaundice (yellow color of eyes and skin)    Weakness, dizziness    Chest, arm, back, neck or jaw pain    Unexpected vaginal bleeding or missed period  Call 911  Call emergency services if any of the following occur:    Trouble breathing    Confusion    Fainting or loss of consciousness    Rapid heart rate    Seizure    [id number] [facility], [address] [place] [address], [place] [address]. All rights reserved. This information is not intended as a substitute for professional medical care. Always follow your healthcare professional's instructions.        Clear Liquid Diet    Clear liquids are any liquid that you can see through. They are also very easy to digest. You may be put on a clear liquid diet if you are recovering from irritation or infection of the stomach or intestinal tract. This diet may also be used before surgery  or special procedures such as a colonoscopy. You should not be on this diet for more than 3 days. Below are some clear liquids you can have on this diet.  Adults and children over 2 years old  Adults should drink a total of 2 to 3 quarts of liquid per day. It may be easier to drink small frequent servings rather than a few large ones. Liquids can include:    Fruit juices. Strained orange juice or lemonade (no pulp); apple, grape, and cranberry juice; clear fruit drinks    Beverages. Sport drinks, sodas, mineral water (plain or flavored), tea, black coffee, liquid gelatin (add twice the recommended amount of water)    Soups. Clear broth    Desserts. Plain gelatin, popsicles, fruit juice bars  Children under 2 years old  Oral rehydration fluids are available at drug stores and most grocery stores. You don t need a prescription.  Date Last Reviewed: 8/1/2016 2000-2017 [employer]. [address] [place] [address], [place] [address]. All rights reserved. This information is not intended as a substitute for professional medical care. Always follow your healthcare professional's instructions.

## 2018-09-05 NOTE — ED PROVIDER NOTES
History     Chief Complaint:  Abdominal Pain    HPI   Moris Rhoades is a 38 year old female with a history of embolism and thrombosis anticoagulated on Coumadin who presents with abdominal pain. The patient reports that about 17 hours ago she woke up with stomach cramps. She states [place] was [place] and that when she was driving home she was burping frequently which somewhat alleviated the discomfort. She notes that when she returned home she developed a headache. She reports that she additionally has had nausea, reduced appetite, vomiting and diarrhea and the persistence of her symptoms prompted her presentation to the ED today. She additionally notes that she feels achy in her back. She denies having dysuria or blood in her stool.    Allergies:  NSAIDs  Sulfa drug    Medications:    Coumadin    Past Medical History:    Abdominal pain  Acute venous embolism and thrombosis of deep vessels of proximal lower extremity  Benign neoplasm of ovary  Eating disorder  Excessive or frequent menstruation  Factor V Leiden mutation, heterozygous  Headache  Joint pain  Obesity  Pneumonia     Past Surgical History:    Repair anterior tibial tubercle  Endometrial biopsy w/o cervical dilation  Knee arthroscopy x2  Removal of ovary/tubes  Bariatric surgery  Cholecystectomy     Family History:    Diabetes  Allergies  Hypertension  Asthma     Social History:  Marital Status:  Single [1]  Smoking status: Never Smoker  Alcohol use: Yes     Review of Systems   Constitutional: Positive for appetite change (reduced).   Gastrointestinal: Positive for abdominal pain, diarrhea, nausea and vomiting. Negative for blood in stool.   Genitourinary: Negative for dysuria.   Musculoskeletal: Positive for back pain.   Neurological: Positive for headaches.   All other systems reviewed and are negative.    Physical Exam     Patient Vitals for the past 24 hrs:   BP Temp Temp src Heart Rate Resp SpO2   09/05/18 0400 113/70 - - - -  99 %   09/05/18 0330 121/75 - - - - 100 %   09/05/18 0159 144/79 97.8  F (36.6  C) Temporal 96 16 98 %       Physical Exam   Constitutional: She appears well-developed and well-nourished.   HENT:   Head: Atraumatic.   Right Ear: External ear normal.   Left Ear: External ear normal.   Mouth/Throat: Oropharynx is clear and moist. No oropharyngeal exudate.   TM's clear bilaterally   Eyes: Conjunctivae are normal. Pupils are equal, round, and reactive to light. No scleral icterus.   Neck: Normal range of motion. Neck supple.   Cardiovascular: Normal rate, regular rhythm, normal heart sounds and intact distal pulses.  Exam reveals no gallop and no friction rub.    No murmur heard.  Pulmonary/Chest: Effort normal and breath sounds normal. No respiratory distress. She has no wheezes. She has no rales.   Abdominal: Soft. Bowel sounds are normal. She exhibits no distension and no mass. There is tenderness.   Upper abd TTP, worst in epigastric area   Musculoskeletal: Normal range of motion. She exhibits no edema.   Neurological: She is alert.   Skin: Skin is warm and dry. No rash noted.   Psychiatric: She has a normal mood and affect.         Emergency Department Course   Imaging:  Radiographic findings were communicated with the patient who voiced understanding of the findings.    CT Abdomen Pelvis w Contrast:  1. Fluid is present in the colonic lumen. This is nonspecific, but could relate to gastroenteritis.  2. A trace amount of nonspecific free fluid in the pelvis.  3. No other cause of acute pain identified in the abdomen or pelvis.  As read by Radiology    Laboratory:  CBC: WNL (WBC 7.4, HGB 12.9, )  CMP: Glucose 107 (H), Calcium 7.6 (L), Albumin 3.0 (L), Protein Total 5.9 (L), AST 48 (H), o/w WNL (Creatinine 0.68)  Lipase: 98  Lactic Acid Whole: 0.9  INR: 3.22 (H)  HCG Qualitative Blood: Negative  UA: Blood-Small, o/w Negative    Interventions:  0218: NS 1L IV Bolus  0218: Zofran 4 mg IV  0226: Protonix 40 mg  IV    Emergency Department Course:  Past medical records, nursing notes, and vitals reviewed.  0206: I performed an exam of the patient and obtained history, as documented above.   IV inserted and blood drawn.  The patient was sent for an abdomen/pelvis CT while in the emergency department, findings above.    0345: I rechecked the patient who did a PO challenge. Explained findings to the patient.    Findings and plan explained to the patient. Patient discharged home with instructions regarding supportive care, medications, and reasons to return. The importance of close follow-up was reviewed.     Impression & Plan    Medical Decision Making:  Moris Rhoades is a 38 year old female who presents with upper abdominal pain, nausea, vomiting and diarrhea. Patient on initial exam appeared uncomfortable and seemed to have pain concentrated in the epigastric region. No rebound tenderness which was appreciated. She also has some generalized diffuse body aches along with a headache. Her exam is nonfocal. There are no concerning neurological findings. I felt likely the headache is related to dehydration as well as her ongoing abdominal pain and the feeling of unwell. She did not have a ride and asked to drive herself home, and therefore she was only given fluids, zofran and protonix. She is feeling better and is passing a PO challenge. We did agree to send her home with zofran and vicodin. I did place her on a clear liquid diet as well. There is some finding of possible gastroenteritis on her CT so likely this could be a viral illness. She was asked to push fluids and rest and if symptoms worsen she needs to return. Otherwise she needs to follow up in 2 days with her regular doctor. ED diagnosis: abdominal pain.    Diagnosis:    ICD-10-CM   1. Abdominal pain, epigastric R10.13       Disposition:  discharged to home    Discharge Medications:   Details   HYDROcodone-acetaminophen (NORCO) 5-325 MG per tablet Take 1 tablet by  mouth every 6 hours as needed for severe pain, Disp-10 tablet, R-0, Local Print      ondansetron (ZOFRAN ODT) 4 MG ODT tab Take 1 tablet (4 mg) by mouth every 8 hours as needed, Disp-10 tablet, R-0, Local Print         Elsa Hussein  9/5/2018   [facility] EMERGENCY DEPARTMENT  I, Elsa Hussein, am serving as a scribe at 2:06 AM on 9/5/2018 to document services personally performed by Axel Marie MD based on my observations and the provider's statements to me.        Axel Marie MD  09/05/18 0602

## 2018-09-05 NOTE — ED TRIAGE NOTES
Patient presents to ED due  Upper abd pain.   Onset developed yesterday morning now having chills, decreased appetite, vomiting     Last BM this AM , but was diarrhea    Hx gastric bypass

## 2018-09-05 NOTE — ED AVS SNAPSHOT
[facility] Emergency Department    [address] [place] [address]    Phone:  [phone]    Fax:  [phone]                                       Moris Rhoades   MRN: [id number]    Department:  [facility] Emergency Department   Date of Visit:  9/5/2018           Patient Information     Date Of Birth          1980        Your diagnoses for this visit were:     Abdominal pain, epigastric        You were seen by Axel Marie MD.      Follow-up Information     Follow up with Chantel Chamberlain MD. Go in 2 days.    Contact information:    [facility]  [address] [place] [address]  [phone]          Discharge Instructions         *Abdominal Pain, Unknown Cause (Female)    The exact cause of your abdominal (stomach) pain is not certain. This does not mean that this is something to worry about, or the right tests were not done. Everyone likes to know the exact cause of the problem, but sometimes with abdominal pain, there is no clear-cut cause, and this could be a good thing. The good news is that your symptoms can be treated, and you will feel better.   Your condition does not seem serious now; however, sometimes the signs of a serious problem may take more time to appear. For this reason, it is important for you to watch for any new symptoms, problems, or worsening of your condition.  Over the next few days, the abdominal pain may come and go, or be continuous. Other common symptoms can include nausea and vomiting. Sometimes it can be difficult to tell if you feel nauseous, you may just feel bad and not associate that feeling with nausea. Constipation, diarrhea, and a fever may go along with the pain.  The pain may continue even if treated correctly over the following days. Depending on how things go, sometimes the cause can become clear and may require further or different treatment. Additional evaluations, medications, or tests may be  needed.  Home care  Your health care provider may prescribe medications for pain, symptoms, or an infection.  Follow the health care provider's instructions for taking these medications.  General care    Rest until your next exam. No strenuous activities.    Try to find positions that ease discomfort. A small pillow placed on the abdomen may help relieve pain.    Something warm on your abdomen (such as a heating pad) may help, but be careful not to burn yourself.  Diet    Do not force yourself to eat, especially if having cramps, vomiting, or diarrhea.    Water is important so you do not get dehydrated. Soup may also be good. Sports drinks may also help, especially if they are not too acidic. Make sure you don't drink sugary drinks as this can make things worse. Take liquids in small amounts. Do not guzzle them.    Caffeine sometimes makes the pain and cramping worse.    Avoid dairy products if you have vomiting or diarrhea.    Don't eat large amounts at a time. Wait a few minutes between bites.    Eat a diet low in fiber (called a low-residue diet). Foods allowed include refined breads, white rice, fruit and vegetable juices without pulp, tender meats. These foods will pass more easily through the intestine.    Avoid fried or fatty foods, dairy, alcohol and spicy foods until your symptoms go away.  Follow-up care  Follow up with your health care provider as instructed, or if your pain does not begin to improve in the next 24 hours.  When to seek medical care  Seek prompt medical care if any of the following occur:    Pain gets worse or moves to the right lower abdomen    New or worsening vomiting or diarrhea    Swelling of the abdomen    Unable to pass stool for more than three days    New fever over 101  F (38.3 C), or rising fever    Blood in vomit or bowel movements (dark red or black color)    Jaundice (yellow color of eyes and skin)    Weakness, dizziness    Chest, arm, back, neck or jaw pain    Unexpected  vaginal bleeding or missed period  Call 911  Call emergency services if any of the following occur:    Trouble breathing    Confusion    Fainting or loss of consciousness    Rapid heart rate    Seizure    [id number] Alexa Gutierrez, [address], [place] [id number]. All rights reserved. This information is not intended as a substitute for professional medical care. Always follow your healthcare professional's instructions.        Clear Liquid Diet    Clear liquids are any liquid that you can see through. They are also very easy to digest. You may be put on a clear liquid diet if you are recovering from irritation or infection of the stomach or intestinal tract. This diet may also be used before surgery or special procedures such as a colonoscopy. You should not be on this diet for more than 3 days. Below are some clear liquids you can have on this diet.  Adults and children over 2 years old  Adults should drink a total of 2 to 3 quarts of liquid per day. It may be easier to drink small frequent servings rather than a few large ones. Liquids can include:    Fruit juices. Strained orange juice or lemonade (no pulp); apple, grape, and cranberry juice; clear fruit drinks    Beverages. Sport drinks, sodas, mineral water (plain or flavored), tea, black coffee, liquid gelatin (add twice the recommended amount of water)    Soups. Clear broth    Desserts. Plain gelatin, popsicles, fruit juice bars  Children under 2 years old  Oral rehydration fluids are available at drug stores and most grocery stores. You don t need a prescription.  Date Last Reviewed: 8/1/2016 2000-2017 [employer]. [address], [place] [address]. All rights reserved. This information is not intended as a substitute for professional medical care. Always follow your healthcare professional's instructions.          24 Hour Appointment Hotline       To make an appointment at any [facility], call [phone]  ([phone]). If you don't have a family doctor or clinic, we will help you find one. [place] clinics are conveniently located to serve the needs of you and your family.             Review of your medicines      START taking        Dose / Directions Last dose taken    HYDROcodone-acetaminophen 5-325 MG per tablet   Commonly known as:  NORCO   Dose:  1 tablet   Quantity:  10 tablet        Take 1 tablet by mouth every 6 hours as needed for severe pain   Refills:  0        ondansetron 4 MG ODT tab   Commonly known as:  ZOFRAN ODT   Dose:  4 mg   Quantity:  10 tablet        Take 1 tablet (4 mg) by mouth every 8 hours as needed   Refills:  0          Our records show that you are taking the medicines listed below. If these are incorrect, please call your family doctor or clinic.        Dose / Directions Last dose taken    NO ACTIVE MEDICATIONS        Refills:  0        TYLENOL PO        Refills:  0          STOP taking        Dose Reason for stopping Comments    traMADol 50 MG tablet   Commonly known as:  ULTRAM                      Information about OPIOIDS     PRESCRIPTION OPIOIDS: WHAT YOU NEED TO KNOW   We gave you an opioid (narcotic) pain medicine. It is important to manage your pain, but opioids are not always the best choice. You should first try all the other options your care team gave you. Take this medicine for as short a time (and as few doses) as possible.    Some activities can increase your pain, such as bandage changes or therapy sessions. It may help to take your pain medicine 30 to 60 minutes before these activities. Reduce your stress by getting enough sleep, working on hobbies you enjoy and practicing relaxation or meditation. Talk to your care team about ways to manage your pain beyond prescription opioids.    These medicines have risks:    DO NOT drive when on new or higher doses of pain medicine. These medicines can affect your alertness and reaction times, and you could be arrested for  driving under the influence (DUI). If you need to use opioids long-term, talk to your care team about driving.    DO NOT operate heavy machinery    DO NOT do any other dangerous activities while taking these medicines.    DO NOT drink any alcohol while taking these medicines.     If the opioid prescribed includes acetaminophen, DO NOT take with any other medicines that contain acetaminophen. Read all labels carefully. Look for the word  acetaminophen  or  Tylenol.  Ask your pharmacist if you have questions or are unsure.    You can get addicted to pain medicines, especially if you have a history of addiction (chemical, alcohol or substance dependence). Talk to your care team about ways to reduce this risk.    All opioids tend to cause constipation. Drink plenty of water and eat foods that have a lot of fiber, such as fruits, vegetables, prune juice, apple juice and high-fiber cereal. Take a laxative (Miralax, milk of magnesia, Colace, Senna) if you don t move your bowels at least every other day. Other side effects include upset stomach, sleepiness, dizziness, throwing up, tolerance (needing more of the medicine to have the same effect), physical dependence and slowed breathing.    Store your pills in a secure place, locked if possible. We will not replace any lost or stolen medicine. If you don t finish your medicine, please throw away (dispose) as directed by your pharmacist. The [place] [employer] has more information about safe disposal: https://www.pca.state.[place]/living-green/managing-unwanted-medications        Prescriptions were sent or printed at these locations (2 Prescriptions)                   Other Prescriptions                Printed at Department/Unit printer (2 of 2)         HYDROcodone-acetaminophen (NORCO) 5-325 MG per tablet               ondansetron (ZOFRAN ODT) 4 MG ODT tab                Procedures and tests performed during your visit     CBC with platelets differential    CT  Abdomen Pelvis w Contrast    Comprehensive metabolic panel    HCG qualitative Blood    INR    Lactic acid whole blood    Lipase    UA with Microscopic      Orders Needing Specimen Collection     None      Pending Results     No orders found from 9/3/2018 to 9/6/2018.            Pending Culture Results     No orders found from 9/3/2018 to 9/6/2018.            Pending Results Instructions     If you had any lab results that were not finalized at the time of your Discharge, you can call the ED Lab Result RN at [phone]. You will be contacted by this team for any positive Lab results or changes in treatment. The nurses are available 7 days a week from 10A to 6:30P.  You can leave a message 24 hours per day and they will return your call.        Test Results From Your Hospital Stay        9/5/2018  2:42 AM      Component Results     Component Value Ref Range & Units Status    WBC 7.4 4.0 - 11.0 10e9/L Final    RBC Count 5.48 (H) 3.8 - 5.2 10e12/L Final    Hemoglobin 12.9 11.7 - 15.7 g/dL Final    Hematocrit 42.6 35.0 - 47.0 % Final    MCV 78 78 - 100 fl Final    MCH 23.5 (L) 26.5 - 33.0 pg Final    MCHC 30.3 (L) 31.5 - 36.5 g/dL Final    RDW 23.2 (H) 10.0 - 15.0 % Final    Platelet Count 194 150 - 450 10e9/L Final    Diff Method Automated Method  Final    % Neutrophils 87.8 % Final    % Lymphocytes 7.5 % Final    % Monocytes 4.1 % Final    % Eosinophils 0.0 % Final    % Basophils 0.3 % Final    % Immature Granulocytes 0.3 % Final    Nucleated RBCs 0 0 /100 Final    Absolute Neutrophil 6.5 1.6 - 8.3 10e9/L Final    Absolute Lymphocytes 0.6 (L) 0.8 - 5.3 10e9/L Final    Absolute Monocytes 0.3 0.0 - 1.3 10e9/L Final    Absolute Eosinophils 0.0 0.0 - 0.7 10e9/L Final    Absolute Basophils 0.0 0.0 - 0.2 10e9/L Final    Abs Immature Granulocytes 0.0 0 - 0.4 10e9/L Final    Absolute Nucleated RBC 0.0  Final         9/5/2018  2:40 AM      Component Results     Component Value Ref Range & Units Status    INR 3.22 (H) 0.86 -  1.14 Final         9/5/2018  2:48 AM      Component Results     Component Value Ref Range & Units Status    Sodium 139 133 - 144 mmol/L Final    Potassium 4.0 3.4 - 5.3 mmol/L Final    Chloride 109 94 - 109 mmol/L Final    Carbon Dioxide 22 20 - 32 mmol/L Final    Anion Gap 8 3 - 14 mmol/L Final    Glucose 107 (H) 70 - 99 mg/dL Final    Urea Nitrogen 7 7 - 30 mg/dL Final    Creatinine 0.68 0.52 - 1.04 mg/dL Final    GFR Estimate >90 >60 mL/min/1.7m2 Final    Non  GFR Calc    GFR Estimate If Black >90 >60 mL/min/1.7m2 Final    African American GFR Calc    Calcium 7.6 (L) 8.5 - 10.1 mg/dL Final    Bilirubin Total 1.0 0.2 - 1.3 mg/dL Final    Albumin 3.0 (L) 3.4 - 5.0 g/dL Final    Protein Total 5.9 (L) 6.8 - 8.8 g/dL Final    Alkaline Phosphatase 53 40 - 150 U/L Final    ALT 43 0 - 50 U/L Final    AST 48 (H) 0 - 45 U/L Final         9/5/2018  2:48 AM      Component Results     Component Value Ref Range & Units Status    Lipase 98 73 - 393 U/L Final         9/5/2018  2:29 AM      Component Results     Component Value Ref Range & Units Status    Lactic Acid 0.9 0.7 - 2.0 mmol/L Final         9/5/2018  2:54 AM      Component Results     Component Value Ref Range & Units Status    HCG Qualitative Serum Negative NEG^Negative Final    This test is for screening purposes.  Results should be interpreted along with   the clinical picture.  Confirmation testing is available if warranted by   ordering [id number], HCG Quantitative Pregnancy.           9/5/2018  3:25 AM      Component Results     Component Value Ref Range & Units Status    Color Urine Straw  Final    Appearance Urine Clear  Final    Glucose Urine Negative NEG^Negative mg/dL Final    Bilirubin Urine Negative NEG^Negative Final    Ketones Urine Negative NEG^Negative mg/dL Final    Specific Gravity Urine 1.018 1.003 - 1.035 Final    Blood Urine Small (A) NEG^Negative Final    pH Urine 6.0 5.0 - 7.0 pH Final    Protein Albumin Urine Negative NEG^Negative  mg/dL Final    Urobilinogen mg/dL 0.0 0.0 - 2.0 mg/dL Final    Nitrite Urine Negative NEG^Negative Final    Leukocyte Esterase Urine Negative NEG^Negative Final    Source Midstream Urine  Final    WBC Urine 4 0 - 5 /HPF Final    RBC Urine 2 0 - 2 /HPF Final    Squamous Epithelial /HPF Urine 1 0 - 1 /HPF Final         9/5/2018  3:37 AM      Narrative     CT ABDOMEN AND PELVIS WITH CONTRAST  9/5/2018 3:10 AM     HISTORY: Midline upper abdominal pain.    COMPARISON: 8/2/2007.    TECHNIQUE: Following the uneventful administration of 100 mL  Isovue-370 intravenous contrast, helical sections were acquired from  the top of the diaphragm through the pubic symphysis. Coronal  reconstructions were generated. Radiation dose for this scan was  reduced using automated exposure control, adjustment of the mA and/or  kV according to the patient's size, or iterative reconstruction  technique.    FINDINGS:  Abdomen: The liver, spleen, pancreas, adrenal glands and kidneys are  unremarkable. Prior cholecystectomy. Prior gastric surgery. No  enlarged lymph nodes or free fluid in the upper abdomen.    Scan through the lower chest is unremarkable.    Pelvis: The small and large bowel are normal in caliber. The appendix  is unremarkable. No bowel wall thickening, pneumatosis or free  intraperitoneal gas. Fluid is present in the colonic lumen. An  intrauterine device is present in the central uterus. No enlarged  lymph nodes in the pelvis. A trace amount of free fluid in the pelvis.        Impression     IMPRESSION:  1. Fluid is present in the colonic lumen. This is nonspecific, but  could relate to gastroenteritis.  2. A trace amount of nonspecific free fluid in the pelvis.  3. No other cause of acute pain identified in the abdomen or pelvis.    LISA ROBERTS MD                Clinical Quality Measure: Blood Pressure Screening     Your blood pressure was checked while you were in the emergency department today. The last reading we  obtained was  BP: 121/75 . Please read the guidelines below about what these numbers mean and what you should do about them.  If your systolic blood pressure (the top number) is less than 120 and your diastolic blood pressure (the bottom number) is less than 80, then your blood pressure is normal. There is nothing more that you need to do about it.  If your systolic blood pressure (the top number) is 120-139 or your diastolic blood pressure (the bottom number) is 80-89, your blood pressure may be higher than it should be. You should have your blood pressure rechecked within a year by a primary care provider.  If your systolic blood pressure (the top number) is 140 or greater or your diastolic blood pressure (the bottom number) is 90 or greater, you may have high blood pressure. High blood pressure is treatable, but if left untreated over time it can put you at risk for heart attack, stroke, or kidney failure. You should have your blood pressure rechecked by a primary care provider within the next 4 weeks.  If your provider in the emergency department today gave you specific instructions to follow-up with your doctor or provider even sooner than that, you should follow that instruction and not wait for up to 4 weeks for your follow-up visit.        Thank you for choosing [facility]       Thank you for choosing [facility] for your care. Our goal is always to provide you with excellent care. Hearing back from our patients is one way we can continue to improve our services. Please take a few minutes to complete the written survey that you may receive in the mail after you visit with us. Thank you!        [employer] Information     [employer] gives you secure access to your electronic health record. If you see a primary care provider, you can also send messages to your care team and make appointments. If you have questions, please call your primary care clinic.  If you do not have a primary care provider, please call [phone]  and they will assist you.        Care EveryWhere ID     This is your Care EveryWhere ID. This could be used by other organizations to access your [facility] medical records  [id number]        Equal Access to Services     SHAI WATKINS : Tanner Nieto, wang salazar, panfilo matt, zeeshan stark. So [facility] [phone].    ATENCIÓN: Si habla español, tiene a barlow disposición servicios gratuitos de asistencia lingüística. Llame al [phone].    We comply with applicable federal civil rights laws and [place] laws. We do not discriminate on the basis of race, color, national origin, age, disability, sex, sexual orientation, or gender identity.            After Visit Summary       This is your record. Keep this with you and show to your community pharmacist(s) and doctor(s) at your next visit.

## 2019-11-03 ENCOUNTER — HEALTH MAINTENANCE LETTER (OUTPATIENT)
Age: 39
End: 2019-11-03

## 2020-11-16 ENCOUNTER — HEALTH MAINTENANCE LETTER (OUTPATIENT)
Age: 40
End: 2020-11-16

## 2021-09-18 ENCOUNTER — HEALTH MAINTENANCE LETTER (OUTPATIENT)
Age: 41
End: 2021-09-18

## 2022-01-08 ENCOUNTER — HEALTH MAINTENANCE LETTER (OUTPATIENT)
Age: 42
End: 2022-01-08

## 2022-11-20 ENCOUNTER — HEALTH MAINTENANCE LETTER (OUTPATIENT)
Age: 42
End: 2022-11-20

## 2023-04-15 ENCOUNTER — HEALTH MAINTENANCE LETTER (OUTPATIENT)
Age: 43
End: 2023-04-15

## 2024-01-28 ENCOUNTER — HEALTH MAINTENANCE LETTER (OUTPATIENT)
Age: 44
End: 2024-01-28